# Patient Record
Sex: FEMALE
[De-identification: names, ages, dates, MRNs, and addresses within clinical notes are randomized per-mention and may not be internally consistent; named-entity substitution may affect disease eponyms.]

---

## 2017-01-17 ENCOUNTER — RESULT REVIEW (OUTPATIENT)
Age: 32
End: 2017-01-17

## 2021-11-11 ENCOUNTER — TRANSCRIPTION ENCOUNTER (OUTPATIENT)
Age: 36
End: 2021-11-11

## 2023-01-30 ENCOUNTER — RX RENEWAL (OUTPATIENT)
Age: 38
End: 2023-01-30

## 2023-02-21 ENCOUNTER — APPOINTMENT (OUTPATIENT)
Dept: ORTHOPEDIC SURGERY | Facility: CLINIC | Age: 38
End: 2023-02-21
Payer: COMMERCIAL

## 2023-02-21 ENCOUNTER — APPOINTMENT (OUTPATIENT)
Dept: ORTHOPEDIC SURGERY | Facility: CLINIC | Age: 38
End: 2023-02-21

## 2023-02-21 VITALS — BODY MASS INDEX: 31.28 KG/M2 | WEIGHT: 170 LBS | HEIGHT: 62 IN

## 2023-02-21 DIAGNOSIS — M25.542 PAIN IN JOINTS OF LEFT HAND: ICD-10-CM

## 2023-02-21 PROCEDURE — 73130 X-RAY EXAM OF HAND: CPT | Mod: LT

## 2023-02-21 PROCEDURE — 99213 OFFICE O/P EST LOW 20 MIN: CPT

## 2023-02-21 NOTE — ASSESSMENT
[FreeTextEntry1] : Patient is pain in the left hand I think she may have some early degenerative changes present I am going to send Mobic to the pharmacy discussed cortisone injections into the joint which are not necessary at this time.

## 2023-02-21 NOTE — PHYSICAL EXAM
[de-identified] : Patient is stiffness of the left index finger at the MP joint is no active triggering is normal sensation normal cap refill.  No erythema ecchymoses or abrasions.  Her her passive range of motion of the left MP joint is not as good as the passive range of motion to the middle finger MP joint

## 2023-02-21 NOTE — DATA REVIEWED
[FreeTextEntry1] : Radiographs 3 views left hand are reviewed showing no fracture dislocation no significant degenerative change

## 2023-02-21 NOTE — HISTORY OF PRESENT ILLNESS
[de-identified] : 38-year-old female has pain discomfort in her left hand on the left hand index finger at the MP joint and she comes in for evaluation today she was treated in the past with trigger finger releases.

## 2024-01-04 RX ORDER — MELOXICAM 15 MG/1
15 TABLET ORAL
Qty: 30 | Refills: 2 | Status: ACTIVE | COMMUNITY
Start: 2023-02-21 | End: 1900-01-01

## 2024-09-16 ENCOUNTER — NON-APPOINTMENT (OUTPATIENT)
Age: 39
End: 2024-09-16

## 2024-09-27 ENCOUNTER — APPOINTMENT (OUTPATIENT)
Dept: ORTHOPEDIC SURGERY | Facility: CLINIC | Age: 39
End: 2024-09-27
Payer: COMMERCIAL

## 2024-09-27 VITALS — WEIGHT: 180 LBS | BODY MASS INDEX: 33.13 KG/M2 | HEIGHT: 62 IN

## 2024-09-27 DIAGNOSIS — M65.9 SYNOVITIS AND TENOSYNOVITIS, UNSPECIFIED: ICD-10-CM

## 2024-09-27 PROCEDURE — 99213 OFFICE O/P EST LOW 20 MIN: CPT

## 2024-09-27 RX ORDER — MELOXICAM 15 MG/1
15 TABLET ORAL
Qty: 90 | Refills: 1 | Status: ACTIVE | COMMUNITY
Start: 2024-09-27 | End: 1900-01-01

## 2024-09-27 NOTE — ASSESSMENT
[FreeTextEntry1] : Patient has FCR tenosynovitis.  The plan is bracing observation anti-inflammatories cortisone injection perhaps in the future see me back on an as-needed basis.  Any additions or problems contact the office the natural history was discussed

## 2024-09-27 NOTE — PHYSICAL EXAM
[de-identified] : Patient is good range of motion to the fingers.  Patient is tender to palpation along the FCR tendon.  She has pain with resisted wrist flexion.  No swelling.  No erythema ecchymoses or abrasions.

## 2024-09-27 NOTE — HISTORY OF PRESENT ILLNESS
[de-identified] : 39-year-old female right-sided wrist pain discomfort.  Comes in today for evaluation.  She denies any specific trauma.  She did note it started when she was lifting a kayak then she had issues at home when she had a lift again developed pain discomfort in the wrist a bit worse she is tried wearing a brace it has not helped much she takes Mobic already

## 2025-03-26 ENCOUNTER — RX RENEWAL (OUTPATIENT)
Age: 40
End: 2025-03-26